# Patient Record
Sex: FEMALE
[De-identification: names, ages, dates, MRNs, and addresses within clinical notes are randomized per-mention and may not be internally consistent; named-entity substitution may affect disease eponyms.]

---

## 2018-04-17 ENCOUNTER — HOSPITAL ENCOUNTER (EMERGENCY)
Dept: HOSPITAL 88 - FSED | Age: 23
Discharge: HOME | End: 2018-04-17
Payer: COMMERCIAL

## 2018-04-17 VITALS — BODY MASS INDEX: 29.02 KG/M2 | HEIGHT: 64 IN | WEIGHT: 170 LBS

## 2018-04-17 DIAGNOSIS — J02.9: Primary | ICD-10-CM

## 2018-04-17 DIAGNOSIS — B34.9: ICD-10-CM

## 2018-04-17 PROCEDURE — 99282 EMERGENCY DEPT VISIT SF MDM: CPT

## 2018-04-17 NOTE — XMS REPORT
Clinical Summary

 Created on: 2018



Juan Ibrahim

External Reference #: [id number]

: 1995

Sex: Female



Demographics







 Address  [address] [place] [address]  [id number]

 

 Home Phone  [phone]

 

 Preferred Language  English

 

 Marital Status  Single

 

 Mandaeism Affiliation  [id number]

 

 Race  White

 

 Ethnic Group  Non-





Author







 Author  [facility]

 

 Organization  [facility]

 

 Address  Unknown

 

 Phone  Unavailable







Support







 Name  Relationship  Address  Phone

 

 Omid Vigil  Unknown  [phone]







Care Team Providers







 Care Team Member Name  Role  Phone

 

 Asked, Pcp  PCP  Unavailable







Allergies

No Known Allergies



Current Medications







      



  Prescription   Sig.   Disp.   Refills   Start   End Date   Status



      Date  

 

      



  prenatal   Take 1 tablet by mouth       Active



  vit,calc76-iron-folic 29   daily.     



  mg iron- 1 mg tablet per      



  tablet      

 

      



  acetaminophen-codeine   Take 1 tablet by mouth   40 tablet   0   20   



  (TYLENOL WITH CODEINE #3)   every 6 (six) hours as     17   17 



  300-30 mg per tablet   needed for moderate pain     



   for up to 13 days.     







Active Problems







 



  Problem   Noted Date

 

 



  S/P repeat low transverse    2017







Encounters







    



  Date   Type   Specialty   Care Team   Description

 

    



  2017   Hospital   Obstetrics and Gynecology   Adin Denis MD 



  -   Encounter   



  2017   Anesthesia   Obstetrics and Gynecology   Karlos José MD 



   Event   

 

    



  2017   Procedure Pass   Obstetrics and Gynecology  

 

    



  2017   Procedure Pass   Obstetrics and Gynecology  

 

    



  2017   Surgery   Obstetrics and Gynecology   Adin Denis MD   
DELIVERY,  REPEAT



      [[id number]]



after 2017



Immunizations







  



  Name   Dates Previously Given   Next Due

 

  



  Tdap   2017 







Family History







   



  Medical History   Relation   Name   Comments

 

   



  Eclampsia   Mother  

 

   



   labor   Mother  

 

   



  Hypertension   Paternal  



   Grandfather  

 

   



  Diabetes   Paternal  



   Grandmother  

 

   



  Hypertension   Paternal  



   Grandmother  









   



  Relation   Name   Status   Comments

 

   



  Mother   

 

   



  Paternal Grandfather   

 

   



  Paternal Grandmother   







Social History







    



  Tobacco Use   Types   Packs/Day   Years Used   Date

 

    



  Never Smoker    









 Tobacco Cessation: Counseling Given: No











 



  Sex Assigned at Birth   Date Recorded

 

 



  Not on file 







Last Filed Vital Signs







  



  Vital Sign   Reading   Time Taken

 

  



  Blood Pressure   110/59   2017  7:47 AM CDT

 

  



  Pulse   67   2017  7:47 AM CDT

 

  



  Temperature   36.3   C (97.4   F)   2017  7:47 AM CDT

 

  



  Respiratory Rate   18   2017  7:47 AM CDT

 

  



  Oxygen Saturation   96%   2017 10:17 PM CDT

 

  



  Inhaled Oxygen   -   -



  Concentration  

 

  



  Weight   -   -

 

  



  Height   160 cm (5' 3")   2017 12:26 PM CDT

 

  



  Body Mass Index   -   -







Plan of Treatment





Not on file



Procedures







    



  Procedure Name   Priority   Date/Time   Associated Diagnosis   Comments

 

    



  ANESTHESIA SPINAL BLOCK   Routine   2017  



    5:26 PM CDT  

 

  



   Procedure



   Note -



   Brittaney Fernando -



   2017



   5:25 PM



   CDT



   Spinal



   Block



   Performed



   by: KARLOS JOSÉ



   Authorized



   by: KARLOS JOSÉ





   Patient



   Location:



   OR



   Start



   Time:



   2017



   5:15 PM



   End Time:



   2017



   5:20 PM



   Reason for



   Block:



   primary



   anesthetic



   Staff:



   Anesthesio



   lilyt:



   KARLOS JOSÉ



   Performed



   by:



   Anesthesio



   katarina



   patient



   identified



   , IV



   checked,



   site and



   side



   verified,



   risks and



   benefits



   discussed,



   procedure



   verified,



   surgical



   consent



   complete,



   patient



   position



   confirmed,



   monitors



   and



   equipment



   checked



   and pre-op



   evaluation



   complete



   Spinal



   Block:



   Patient



   Position:



   Sitting



   Prep:



   Betadine



   Monitoring



   :  Blood



   pressure



   monitoring



   ,



   continuous



   pulse



   oximetry



   and heart



   rate



   Approach:



   Midline



   Interspace



   :  L3-4



   Injection



   Technique:



   Single



   injection



   Needle:



   Needle



   Type:



   Pencil-tip



   Needle



   Gauge:  25



   G



   Catheter



   Type:



   Closed end



   Assessment



   :



   Coagulatio



   n status:



   Coagulatio



   n status



   verified



   Block



   assessment



   :  No



   apparent



   complicati



   ons



   Post



   procedure:



   Patient



   returned



   to supine



   position



   with left



   lateral



   displaceme



   nt



   Notes:



   Marcaine



   12 mg with



   0.25 mg



   duramorph



 

    



  DELIVERY,  REPEAT    2017   Pregnancy 



    4:30 PM CDT  



after 2017



Results

* CBC with platelet and differential (2017  3:50 AM)



Only the most recent of 2 results within the time period is included.





  



  Component   Value   Ref Range

 

  



  WBC   14.60 (H)   4.50 - 11.00 k/uL

 

  



  RBC   3.40 (L)   4.20 - 5.50 m/uL

 

  



  HGB   10.1 (L)   12.0 - 16.0 g/dL

 

  



  HCT   30.7 (L)   37.0 - 47.0 %

 

  



  MCV   90.3   82.0 - 100.0 fL

 

  



  MCH   29.7   27.0 - 34.0 pg

 

  



  MCHC   32.9   31.0 - 37.0 g/dL

 

  



  RDW - SD   47.5   37.0 - 55.0 fL

 

  



  MPV   10.9   8.8 - 13.2 fL

 

  



  Platelet count   172   150 - 400 k/uL

 

  



  Nucleated RBC   0.00   /100 WBC

 

  



  Neutrophils   74.1 (H)   39.0 - 69.0 %

 

  



  Lymphocytes   17.5 (L)   25.0 - 45.0 %

 

  



  Monocytes   7.0   0.0 - 10.0 %

 

  



  Eosinophils   0.5   0.0 - 5.0 %

 

  



  Basophils   0.2   0.0 - 1.0 %

 

  



  Immature granulocytes   0.7Comment: "Immature granulocytes"   0.0 - 1.0 %



   (promyelocytes, myelocytes, metamyelocytes) 









 



  Specimen   Performing Laboratory

 

 



  Blood   [facility]



   [id number] [place] [address] 



   [place] [address]





* Syphilis treponemal IgG (2017  1:44 PM)





  



  Component   Value   Ref Range

 

  



  Syphilis treponemal IgG   Non-reactiveComment: Non-reactive: No serological   
Non-reactive



   evidence of Syphilis infection 









 



  Specimen   Performing Laboratory

 

 



  Serum   [facility]



   [address] [place] [id number]





* HIV 1, 2 antibody (2017  1:44 PM)



Only the most recent of 2 results within the time period is included.





  



  Component   Value   Ref Range

 

  



  HIV 1, 2 antibody   Nonreactive   Non-reactive



   Comment: 



   Starting from 2016, 4th generation HIV 



   screening 



   and confirmation assays are in use at [facility], consistent with the 



   CDC-recommended algorithm. 



   The screening test detects antibodies to HIV-1, 



   HIV-2 and the 



   p24 antigen. Positive screening results will be 



   automatically 



   reflexed to a HIV-1/HIV-2 differentiation assay. 



   Indeterminant 



   HIV-1 results will be further automatically 



   reflexed to a nucleic 



   acid test for detection of acute infection. 



   Western blot will no 



   longer be performed as a confirmation test. For a 



   quick 



   reference guide on the testing algorithm, please 



   refer to: 



   http://stacks.cdc.gov/view/cdc/13731. 









 



  Specimen   Performing Laboratory

 

 



  Blood   [facility]



   [id number] [place] [address] 



   [place] [address]





* Hepatitis B surface antigen (2017  1:44 PM)



Only the most recent of 2 results within the time period is included.





  



  Component   Value   Ref Range

 

  



  Hepatitis B surface Ag   Nonreactive   Non-reactive









 



  Specimen   Performing Laboratory

 

 



  Blood   [facility]



   [address] [address]





* Type and screen (2017  1:44 PM)





  



  Component   Value   Ref Range

 

  



  ABO grouping   A 

 

  



  Rh type   POS 

 

  



  Antibody screen   NEG 









 



  Specimen   Performing Laboratory

 

 



  Blood   [facility]



   [address] [place] [address]





* Amnisure (2017 12:23 PM)





  



  Component   Value   Ref Range

 

  



  Amnisure, POC   POSITIVE   Negative









 



  Specimen   Performing Laboratory

 

 



  Amniotic fluid   [facility]



   [address] [place] [address]





* RPR titer with reflex to confirmation (2017)





  



  Component   Value   Ref Range

 

  



  RPR (dx) w/refl titer and   negative 



  confirmatory testing  









 



  Specimen   Performing Laboratory

 

 



  Blood   EXTERNAL LAB NON-INTERFACED





* Rubella Ab IgM (2017)





  



  Component   Value   Ref Range

 

  



  Rubella IgM antibody   Immune 









 



  Specimen   Performing Laboratory

 

 



  Blood   EXTERNAL LAB NON-INTERFACED





after 2017



Insurance







     



  Payer   Benefit   Subscriber ID   Type   Phone   Address



   Plan /    



   Group    

 

     



  [employer]   [facility]   xxxxxxxxx   O  



   [facility]    



   [facility]    









     



  Guarantor Name   Account   Relation to   Date of   Phone   Billing Address



   Type   Patient   Birth  

 

     



  LIOR IBRAHIMFELICIA GALARZAE   Personal/F   Self   1995   Home:   [place]



   [facility]     [phone]   [place] [address]